# Patient Record
Sex: FEMALE | Race: BLACK OR AFRICAN AMERICAN | NOT HISPANIC OR LATINO | URBAN - METROPOLITAN AREA
[De-identification: names, ages, dates, MRNs, and addresses within clinical notes are randomized per-mention and may not be internally consistent; named-entity substitution may affect disease eponyms.]

---

## 2019-11-11 ENCOUNTER — EMERGENCY (EMERGENCY)
Facility: HOSPITAL | Age: 69
LOS: 1 days | Discharge: HOME | End: 2019-11-11
Attending: EMERGENCY MEDICINE | Admitting: EMERGENCY MEDICINE
Payer: MEDICARE

## 2019-11-11 VITALS
SYSTOLIC BLOOD PRESSURE: 160 MMHG | DIASTOLIC BLOOD PRESSURE: 98 MMHG | RESPIRATION RATE: 15 BRPM | TEMPERATURE: 98 F | OXYGEN SATURATION: 99 % | HEART RATE: 109 BPM

## 2019-11-11 VITALS
HEART RATE: 90 BPM | OXYGEN SATURATION: 100 % | RESPIRATION RATE: 16 BRPM | SYSTOLIC BLOOD PRESSURE: 151 MMHG | DIASTOLIC BLOOD PRESSURE: 85 MMHG

## 2019-11-11 LAB
ANION GAP SERPL CALC-SCNC: 8 MMO/L — SIGNIFICANT CHANGE UP (ref 7–14)
BASOPHILS # BLD AUTO: 0.05 K/UL — SIGNIFICANT CHANGE UP (ref 0–0.2)
BASOPHILS NFR BLD AUTO: 0.9 % — SIGNIFICANT CHANGE UP (ref 0–2)
BUN SERPL-MCNC: 18 MG/DL — SIGNIFICANT CHANGE UP (ref 7–23)
CALCIUM SERPL-MCNC: 9.8 MG/DL — SIGNIFICANT CHANGE UP (ref 8.4–10.5)
CHLORIDE SERPL-SCNC: 103 MMOL/L — SIGNIFICANT CHANGE UP (ref 98–107)
CO2 SERPL-SCNC: 32 MMOL/L — HIGH (ref 22–31)
CREAT SERPL-MCNC: 0.83 MG/DL — SIGNIFICANT CHANGE UP (ref 0.5–1.3)
EOSINOPHIL # BLD AUTO: 0.15 K/UL — SIGNIFICANT CHANGE UP (ref 0–0.5)
EOSINOPHIL NFR BLD AUTO: 2.8 % — SIGNIFICANT CHANGE UP (ref 0–6)
GLUCOSE SERPL-MCNC: 96 MG/DL — SIGNIFICANT CHANGE UP (ref 70–99)
HCT VFR BLD CALC: 44 % — SIGNIFICANT CHANGE UP (ref 34.5–45)
HGB BLD-MCNC: 13.5 G/DL — SIGNIFICANT CHANGE UP (ref 11.5–15.5)
IMM GRANULOCYTES NFR BLD AUTO: 0.2 % — SIGNIFICANT CHANGE UP (ref 0–1.5)
LYMPHOCYTES # BLD AUTO: 1.44 K/UL — SIGNIFICANT CHANGE UP (ref 1–3.3)
LYMPHOCYTES # BLD AUTO: 27.2 % — SIGNIFICANT CHANGE UP (ref 13–44)
MCHC RBC-ENTMCNC: 26.7 PG — LOW (ref 27–34)
MCHC RBC-ENTMCNC: 30.7 % — LOW (ref 32–36)
MCV RBC AUTO: 87 FL — SIGNIFICANT CHANGE UP (ref 80–100)
MONOCYTES # BLD AUTO: 0.42 K/UL — SIGNIFICANT CHANGE UP (ref 0–0.9)
MONOCYTES NFR BLD AUTO: 7.9 % — SIGNIFICANT CHANGE UP (ref 2–14)
NEUTROPHILS # BLD AUTO: 3.23 K/UL — SIGNIFICANT CHANGE UP (ref 1.8–7.4)
NEUTROPHILS NFR BLD AUTO: 61 % — SIGNIFICANT CHANGE UP (ref 43–77)
NRBC # FLD: 0 K/UL — SIGNIFICANT CHANGE UP (ref 0–0)
PLATELET # BLD AUTO: 182 K/UL — SIGNIFICANT CHANGE UP (ref 150–400)
PMV BLD: 12 FL — SIGNIFICANT CHANGE UP (ref 7–13)
POTASSIUM SERPL-MCNC: 3.6 MMOL/L — SIGNIFICANT CHANGE UP (ref 3.5–5.3)
POTASSIUM SERPL-SCNC: 3.6 MMOL/L — SIGNIFICANT CHANGE UP (ref 3.5–5.3)
RBC # BLD: 5.06 M/UL — SIGNIFICANT CHANGE UP (ref 3.8–5.2)
RBC # FLD: 13.1 % — SIGNIFICANT CHANGE UP (ref 10.3–14.5)
SODIUM SERPL-SCNC: 143 MMOL/L — SIGNIFICANT CHANGE UP (ref 135–145)
WBC # BLD: 5.3 K/UL — SIGNIFICANT CHANGE UP (ref 3.8–10.5)
WBC # FLD AUTO: 5.3 K/UL — SIGNIFICANT CHANGE UP (ref 3.8–10.5)

## 2019-11-11 PROCEDURE — 70498 CT ANGIOGRAPHY NECK: CPT | Mod: 26

## 2019-11-11 PROCEDURE — 93010 ELECTROCARDIOGRAM REPORT: CPT

## 2019-11-11 PROCEDURE — 70496 CT ANGIOGRAPHY HEAD: CPT | Mod: 26

## 2019-11-11 PROCEDURE — 99284 EMERGENCY DEPT VISIT MOD MDM: CPT | Mod: 25,GC

## 2019-11-11 RX ORDER — ACETAMINOPHEN 500 MG
975 TABLET ORAL ONCE
Refills: 0 | Status: COMPLETED | OUTPATIENT
Start: 2019-11-11 | End: 2019-11-11

## 2019-11-11 RX ADMIN — Medication 975 MILLIGRAM(S): at 08:59

## 2019-11-11 NOTE — ED PROVIDER NOTE - PATIENT PORTAL LINK FT
You can access the FollowMyHealth Patient Portal offered by [facility] by registering at the following website: [email or web address]. By joining [employer]’s FollowMyHealth portal, you will also be able to view your health information using other applications (apps) compatible with our system.

## 2019-11-11 NOTE — PROGRESS NOTE ADULT - SUBJECTIVE AND OBJECTIVE BOX
Neurology  Consult Note  11-11-19    Name:  GLEN AMADO; 69y (1950)    Reason for Admission: left-sided weakness    Chief Complaint:  HPI: The patient is a 69-year-old right-handed  woman with a PMH of HTN, HLD, migraine, and sciatica who presents to the [facility] ED with 2 days of gradually improving left arm and leg weakness. The patient's last known well was Friday evening before going to bed. Saturday morning, the patient woke up with feelings of weakness in her left arm and leg. She got out of bed and walked to the bathroom, during which the weakness felt more pronounced. She attributes the weakness to sleeping on the affected side. She denies headache, seizure-like convulsions, feelings of confusion, difficulty speaking, but endorses feelings of numbness on both side of her face and her left-sided extremities. Of note, the patient has not taken her prescribed statin for the last 2 months due to cramps, which usually occur in her legs but recently began to occur in her chest. She describes the pain as "cramp-like" and "like a nervous feeling."     Review of Systems:  As states in HPI.        PMHx:   Hypertension  Hyperlipidemia  Sciatica      PFx:   Siblings - CHF, COPD, non-Hodgkin's lymphoma  Mother - CHF  Father - PE     PSuHx:   Hysterectomy in 2013     PSoHx:   Lives in [place]. 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 Denies smoking, drinking, or other illicit/recreational drug use.      Marital Status:  (   )    (  X ) Single    (   )    (  )   Occupation:   Lives with: ( X ) alone  (  ) children   (  ) spouse   (  ) parents  (  ) other  Recent Travel:     Substance Use (street drugs): (X  ) never used  (  ) other:  Tobacco Usage:  (  X ) never smoked   (   ) former smoker   (   ) current smoker  (     ) pack year  Alcohol Usage:  Sexual History:   Sexually active with boyfriend.        Medications:  MEDICATIONS  (STANDING):    MEDICATIONS  (PRN):    Vitals:  T(C): 36.8 (11-11-19 @ 07:40), Max: 36.8 (11-11-19 @ 07:40)  HR: 90 (11-11-19 @ 09:02) (90 - 109)  BP: 151/85 (11-11-19 @ 09:02) (151/85 - 160/98)  RR: 16 (11-11-19 @ 09:02) (15 - 16)  SpO2: 100% (11-11-19 @ 09:02) (99% - 100%)    Labs:                        13.5   5.30  )-----------( 182      ( 11 Nov 2019 09:00 )             44.0     11-11    143  |  103  |  18  ----------------------------<  96  3.6   |  32<H>  |  0.83    Ca    9.8      11 Nov 2019 09:00      CAPILLARY BLOOD GLUCOSE              CSF:                      PHYSICAL EXAMINATION:  General: Well-developed, well nourished, in no acute distress.  Eyes: Conjunctiva and sclera clear.  Neurologic:  - Mental Status:  Alert, awake, oriented to person, place, and time; Speech is fluent with intact naming, repetition, and comprehension; Good overall fund of knowledge; Immediate recall is 3/3 words and delayed recall is 3/3 words at 5 minutes; Able to spell WORLD backwards and perform serial 7 subtraction; Able to read and write a sentence.  - Cranial Nerves II-XII:    II:  Visual fields are full to confrontation; Pupils are equal, round, and reactive to light; Visual acuity is 20/20 bilaterally; Fundoscopic exam is normal with sharp discs.  III, IV, VI:  Extraocular movements are intact without nystagmus.  V:  Facial sensation is intact in the V1-V3 distribution bilaterally.  VII:  Face is symmetric with normal eye closure and smile  VIII:  Hearing is intact to finger rub.  IX, X:  Uvula is midline and soft palate rises symmetrically  XI:  Head turning and shoulder shrug are intact.  XII:  Tongue protudes in the midline.  - Motor:  Strength is 5/5 throughout.  There is no pronator drift.  Normal muscle bulk and tone throughout.  - Reflexes:  2+ and symmetric at the biceps, triceps, brachioradialis, knees, and ankles.  Plantar responses flexor.  - Sensory:  Intact throughout to vibration, and joint-position, light touch, pin prick.  - Coordination:  Finger-nose-finger and heel-knee-shin intact without dysmetria.  Rapid alternating hand movements intact.  - Gait:   Normal steps, base, arm swing, and turning.  Heel and toe walking are normal.  Tandem gait is normal.  Romberg testing is negative.    Radiology:      Impression:    Patient with HTN and HLD presents with 2 days of left-sided weakness and a normal neurologic exam in the setting of recent statin non-adherence. BP elevated on admission. Wait on imaging to determine if CVA occurred, with lacunar infarct a possible cause. ABDC2 score a 6, indicating high risk of stroke following possible TIA.     Plan:  CT non-con head  Anti-platelet therapy (aspirin)      patient on medication adherence  Legend:  [] Uncomplete task.  [P] Ordered and pending task.  [R] Imaging done, pending read.    [x] Completed task. Neurology  Consult Note  11-11-19    Name:  GLEN AMADO; 69y (1950)    Reason for Admission: left-sided weakness    Chief Complaint:  HPI: The patient is a 69-year-old right-handed  woman with a PMH of HTN, HLD, migraine, and sciatica who presents to the [facility] ED with 2 days of gradually improving left arm and leg weakness. The patient's last known well was Friday evening before going to bed. Saturday morning, the patient woke up with feelings of weakness in her left arm and leg. She got out of bed and walked to the bathroom, during which the weakness felt more pronounced. She attributes the weakness to sleeping on the affected side. She denies headache, seizure-like convulsions, feelings of confusion, difficulty speaking, but endorses feelings of numbness on both side of her face and her left-sided extremities. Of note, the patient has not taken her prescribed statin for the last 2 months due to cramps, which usually occur in her legs but recently began to occur in her chest. She describes the pain as "cramp-like" and "like a nervous feeling."     Review of Systems:  As states in HPI.    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      Marital Status:  (   )    (  X ) Single    (   )    (  )   Occupation:   Lives with: ( X ) alone  (  ) children   (  ) spouse   (  ) parents  (  ) other  Recent Travel:     Substance Use (street drugs): (X  ) never used  (  ) other:  Tobacco Usage:  (  X ) never smoked   (   ) former smoker   (   ) current smoker  (     ) pack year  Alcohol Usage:  Sexual History:   Sexually active with boyfriend.        Medications:  MEDICATIONS  (STANDING):    MEDICATIONS  (PRN):    Vitals:  T(C): 36.8 (11-11-19 @ 07:40), Max: 36.8 (11-11-19 @ 07:40)  HR: 90 (11-11-19 @ 09:02) (90 - 109)  BP: 151/85 (11-11-19 @ 09:02) (151/85 - 160/98)  RR: 16 (11-11-19 @ 09:02) (15 - 16)  SpO2: 100% (11-11-19 @ 09:02) (99% - 100%)    PHYSICAL EXAMINATION:  General: Well-developed, well nourished, in no acute distress.  Eyes: Conjunctiva and sclera clear.  Neurologic:  - Mental Status:  Alert, awake, oriented to person, place, and time; Speech is fluent with intact naming, repetition, and comprehension; Good overall fund of knowledge; Immediate recall is 3/3 words and delayed recall is 3/3 words at 5 minutes; Able to spell WORLD backwards and perform serial 7 subtraction; Able to read and write a sentence.  - Cranial Nerves II-XII:    II:  Visual fields are full to confrontation; Pupils are equal, round, and reactive to light; Visual acuity is 20/20 bilaterally; Fundoscopic exam is normal with sharp discs.  III, IV, VI:  Extraocular movements are intact without nystagmus.  V:  Facial sensation is intact in the V1-V3 distribution bilaterally.  VII:  Face is symmetric with normal eye closure and smile  VIII:  Hearing is intact to finger rub.  IX, X:  Uvula is midline and soft palate rises symmetrically  XI:  Head turning and shoulder shrug are intact.  XII:  Tongue protudes in the midline.  - Motor:  Strength is 5/5 throughout.  There is no pronator drift.  Normal muscle bulk and tone throughout.  - Reflexes:  2+ and symmetric at the biceps, triceps, brachioradialis, knees, and ankles.  Plantar responses flexor.  - Sensory:  Intact throughout to vibration, and joint-position, light touch, pin prick.  - Coordination:  Finger-nose-finger and heel-knee-shin intact without dysmetria.  Rapid alternating hand movements intact.  - Gait:   Normal steps, base, arm swing, and turning.  Heel and toe walking are normal.  Tandem gait is normal.  Romberg testing is negative.    Labs:                        13.5   5.30  )-----------( 182      ( 11 Nov 2019 09:00 )             44.0     11-11    143  |  103  |  18  ----------------------------<  96  3.6   |  32<H>  |  0.83    Ca    9.8      11 Nov 2019 09:00    Radiology:

## 2019-11-11 NOTE — CONSULT NOTE ADULT - ASSESSMENT
The patient is a 69-year-old right-handed  woman with a PMH of HTN, HLD, migraine, and sciatica who presents to the [facility] ED with 2 days of gradually improving left arm and leg weakness. The patient's last known well was Friday evening before going to bed. Saturday morning, the patient woke up with feelings of weakness in her left arm and leg. On exam pt is in tact, no focal deficits or weakness.     Impression - Left hemiparesis 2/2 right hemispheric disfunction likely due to small vessel disease .    Plan:  ASA 81mg daily and Plavix for 3 weeks and then asa alone per [facility] protocol for TIA.  Re-start statin with low side effect profile. self DC'ed Lipitor  Please add on hba1c and lipid panel  Pt will follow up with Dr. Patel as an outpatient for full neurological workup as warranted.  Encourage PCP follow up for HTN

## 2019-11-11 NOTE — ED PROVIDER NOTE - CLINICAL SUMMARY MEDICAL DECISION MAKING FREE TEXT BOX
Fer ARECHIGA MD PGY2: Patient here for L arm and leg weakness not demonstrated on neuro exam. Will obtain CTA, CTH non con. If normal, patient can follow-up with neuro as an outpatient.

## 2019-11-11 NOTE — CONSULT NOTE ADULT - SUBJECTIVE AND OBJECTIVE BOX
Neurology  Consult Note  11-11-19    Name:  GLEN AMADO; 69y (1950)    Reason for Admission: left-sided weakness    Chief Complaint:  HPI: The patient is a 69-year-old right-handed  woman with a PMH of HTN, HLD, migraine, and sciatica who presents to the [facility] ED with 2 days of gradually improving left arm and leg weakness. The patient's last known well was Friday evening before going to bed. Saturday morning, the patient woke up with feelings of weakness in her left arm and leg. She got out of bed and walked to the bathroom, during which the weakness felt more pronounced. She attributes the weakness to sleeping on the affected side. She denies headache, seizure-like convulsions, feelings of confusion, difficulty speaking, but endorses feelings of numbness on both side of her face and her left-sided extremities. Of note, the patient has not taken her prescribed statin for the last 2 months due to cramps, which usually occur in her legs but recently began to occur in her chest. She describes the pain as "cramp-like" and "like a nervous feeling."     Review of Systems:  As states in HPI.    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      Marital Status:  (   )    (  X ) Single    (   )    (  )   Occupation:   Lives with: ( X ) alone  (  ) children   (  ) spouse   (  ) parents  (  ) other  Recent Travel:     Substance Use (street drugs): (X  ) never used  (  ) other:  Tobacco Usage:  (  X ) never smoked   (   ) former smoker   (   ) current smoker  (     ) pack year  Alcohol Usage:  Sexual History:   Sexually active with boyfriend.        Medications:  MEDICATIONS  (STANDING):    MEDICATIONS  (PRN):    Vitals:  T(C): 36.8 (11-11-19 @ 07:40), Max: 36.8 (11-11-19 @ 07:40)  HR: 90 (11-11-19 @ 09:02) (90 - 109)  BP: 151/85 (11-11-19 @ 09:02) (151/85 - 160/98)  RR: 16 (11-11-19 @ 09:02) (15 - 16)  SpO2: 100% (11-11-19 @ 09:02) (99% - 100%)    PHYSICAL EXAMINATION:  General: Well-developed, well nourished, in no acute distress.  Eyes: Conjunctiva and sclera clear.  Neurologic:  - Mental Status:  Alert, awake, oriented to person, place, and time; Speech is fluent with intact naming, repetition, and comprehension; Good overall fund of knowledge; Immediate recall is 3/3 words and delayed recall is 3/3 words at 5 minutes; Able to spell WORLD backwards and perform serial 7 subtraction; Able to read and write a sentence.  - Cranial Nerves II-XII:    II:  Visual fields are full to confrontation; Pupils are equal, round, and reactive to light; Visual acuity is 20/20 bilaterally; Fundoscopic exam is normal with sharp discs.  III, IV, VI:  Extraocular movements are intact without nystagmus.  V:  Facial sensation is intact in the V1-V3 distribution bilaterally.  VII:  Face is symmetric with normal eye closure and smile  VIII:  Hearing is intact to finger rub.  IX, X:  Uvula is midline and soft palate rises symmetrically  XI:  Head turning and shoulder shrug are intact.  XII:  Tongue protudes in the midline.  - Motor:  Strength is 5/5 throughout.  There is no pronator drift.  Normal muscle bulk and tone throughout.  - Reflexes:  2+ and symmetric at the biceps, triceps, brachioradialis, knees, and ankles.  Plantar responses flexor.  - Sensory:  Intact throughout to vibration, and joint-position, light touch, pin prick.  - Coordination:  Finger-nose-finger and heel-knee-shin intact without dysmetria.  Rapid alternating hand movements intact.  - Gait:   Normal steps, base, arm swing, and turning.  Heel and toe walking are normal.  Tandem gait is normal.  Romberg testing is negative.    Labs:                        13.5   5.30  )-----------( 182      ( 11 Nov 2019 09:00 )             44.0     11-11    143  |  103  |  18  ----------------------------<  96  3.6   |  32<H>  |  0.83    Ca    9.8      11 Nov 2019 09:00    Radiology:    < from: CT Head No Cont (11.11.19 @ 10:46) >  Impression: No evidence of acute hemorrhage mass or mass effect.    The patient was injected with approximately 90 cc of Omnipaque 350 IV.   Multiple axial sections were performed through the neck and [place]   [place] for purposes of a CT angiogram. Coronal and sagittal   reconstructions were performed. 3-D reconstructions were performed.    Minimal calcification is seen involving the carotid artery bifurcation   bilaterally.    Both distal common carotid, proximal internal and external carotids   appear normal as well as both vertebralarteries. No significant stenosis   is seen.    The dural venous sinuses demonstrate normal enhancement with no evidence   of venous sinus thrombosis.    Evaluation of the soft tissue neck structures appear unremarkable    The visualized portion of both lung apices appear clear.    Impression: Unremarkable CTA of the neck and [place].    < end of copied text >

## 2019-11-11 NOTE — ED PROVIDER NOTE - NSFOLLOWUPCLINICS_GEN_ALL_ED_FT
[facility] - Primary Care  Primary Care  [address] [place] [address]  Phone: [phone]  Fax:   Follow Up Time: 4-6 Days

## 2019-11-11 NOTE — ED ADULT TRIAGE NOTE - CHIEF COMPLAINT QUOTE
alert no distress c/o left sided weakness started on saturday   weakness hasn't changed  no speech disturbance dizziness or blurry vision  hx HTM  high cholesterol    also c/o back pain

## 2019-11-11 NOTE — ED PROVIDER NOTE - CARE PROVIDER_API CALL
Giovanni Patel (DO)  Neurology; Vascular Neurology  [address] [facility]  [place] [address]  Phone: [phone]  Fax: [phone]  Follow Up Time: 1-3 Days

## 2019-11-11 NOTE — PROGRESS NOTE ADULT - ASSESSMENT
The patient is a 69-year-old right-handed  woman with a PMH of HTN, HLD, migraine, and sciatica who presents to the [facility] ED with 2 days of gradually improving left arm and leg weakness. The patient's last known well was Friday evening before going to bed. Saturday morning, the patient woke up with feelings of weakness in her left arm and leg. On exam pt is in tact, no focal deficits or weakness.     Impression -     Plan:  ASA 81mg daily and Plavix for 3 weeks and then asa alone per [facility] protocol for TIA.  Re-start statin with low side effect profile. self DC'ed Lipitor  Please add on hba1c and lipid panel  Pt will follow up with Dr. Patel as an outpatient for full neurological workup

## 2019-11-11 NOTE — ED ADULT NURSE NOTE - OBJECTIVE STATEMENT
Pt presents to  15 A&Ox4 complaining of L sided weakness starting this past saturday 11/9. Pt states she woke up and felt "funny" on saturday morrning. Pt able to move all extremities, denies headache, n/v/d, no facial drooping. Pt complaining of L leg pain 4/10 when moving, 1/10 while laying in bed. Pt presents to  15 A&Ox4 complaining of L sided weakness starting this past saturday 11/9. Pt states she woke up and felt "funny" on saturday morrning. Pt able to move all extremities evenly, denies headache, n/v/d, no facial drooping. Pt complaining of L leg pain 4/10 when moving, 1/10 while laying in bed. Pt put on cardiac monitor, 20g started on R AC, labs drawn and sent. Pt in no obvious distress, will continue to monitor.

## 2019-11-11 NOTE — ED PROVIDER NOTE - NSFOLLOWUPINSTRUCTIONS_ED_ALL_ED_FT
Please follow-up with Dr Patel tomorrow in clinic. Please return to the ED for any concerns. Please follow-up with Dr Patel tomorrow in clinic. At this appointment you should ask about being placed on dual antiplatelet therapy. You should also follow-up with your primary care doctor in the next week to adjust your high blood pressure medication. Please return to the ED for any concerns.

## 2019-11-11 NOTE — ED PROVIDER NOTE - PHYSICAL EXAMINATION
Fer ARECHIGA MD PGY2:   PHYSICAL EXAM:    GENERAL: NAD, well-developed  HEENT:  Atraumatic, Normocephalic  CHEST/LUNG: Chest rise equal bilaterally. CTAB.   HEART: Regular rate and rhythm  ABDOMEN: Soft, Nontender, Nondistended.   EXTREMITIES:  2+ Peripheral Pulses.  PSYCH: A&Ox3  SKIN: No obvious rashes or lesions  NEUROLOGY: strength 5/5 assessed by hang , at elbow and shoulder in L arm. No gross changes in sensation at fingertips or along arm on L arm but "Altered" sensation over L tricep. CN 2 - 12 intact. Able to walk comfortably without issue.

## 2019-11-11 NOTE — ED PROVIDER NOTE - PROGRESS NOTE DETAILS
neurology consulted Fer ARECHIGA MD PGY2: Patient seen by neurology. Imaging nl. No acute process suspected - possible TIA. Can be seen by Dr Patel tomorrow in clinic. Pending discussion with neuro attending, patient to be d/c'd if agreeable.

## 2019-11-11 NOTE — ED PROVIDER NOTE - OBJECTIVE STATEMENT
Fer ARECHIGA MD PGY2: 69 F PMH HTN here with acute L arm and L leg weakness since Saturday. Got out of bed and noticed the weakness. Thought it was from sleeping oddly and didn't think anything of it. Persistent and not worsening, here now due to the lack of improvement. No hx of CVA or MI. Has hx of back pain and sicatica, but normall on the right ride. Patient able to ambulate without difficulty.

## 2019-11-11 NOTE — ED PROVIDER NOTE - ATTENDING CONTRIBUTION TO CARE
69 F PMH HTN here with acute L arm and L leg weakness since Saturday. Got out of bed and noticed the weakness. Thought it was from sleeping oddly and didn't think anything of it. Persistent and not worsening, here now due to the lack of improvement. No hx of CVA or MI. Has hx of back pain and sciatica, but normal on the right ride. Patient able to ambulate without difficulty. On exam: VSS lungs, heart, pulses, abdomen,  extremities, skin, all normal on exam. Neuro Normal speech and mental status, cranial nerves normal, gait normal, no pronator drift, FTN normal, motor and sensory normal. IMP: ? weakness of L arm and leg on and off since Sat (3 days) in 69F with HTN. Currently normal neuro exam. Plan: Labs EKG CT head neuro consult.

## 2021-07-27 ENCOUNTER — EMERGENCY (EMERGENCY)
Facility: HOSPITAL | Age: 71
LOS: 1 days | Discharge: HOME | End: 2021-07-27
Attending: EMERGENCY MEDICINE | Admitting: CLINIC/CENTER
Payer: MEDICARE

## 2021-07-27 VITALS
SYSTOLIC BLOOD PRESSURE: 154 MMHG | DIASTOLIC BLOOD PRESSURE: 104 MMHG | RESPIRATION RATE: 18 BRPM | OXYGEN SATURATION: 97 % | HEART RATE: 96 BPM | TEMPERATURE: 99 F

## 2021-07-27 LAB
ALBUMIN SERPL ELPH-MCNC: 4.2 G/DL — SIGNIFICANT CHANGE UP (ref 3.3–5)
ALP SERPL-CCNC: 85 U/L — SIGNIFICANT CHANGE UP (ref 40–120)
ALT FLD-CCNC: 14 U/L — SIGNIFICANT CHANGE UP (ref 4–33)
ANION GAP SERPL CALC-SCNC: 12 MMOL/L — SIGNIFICANT CHANGE UP (ref 7–14)
AST SERPL-CCNC: 16 U/L — SIGNIFICANT CHANGE UP (ref 4–32)
BASOPHILS # BLD AUTO: 0.06 K/UL — SIGNIFICANT CHANGE UP (ref 0–0.2)
BASOPHILS NFR BLD AUTO: 0.8 % — SIGNIFICANT CHANGE UP (ref 0–2)
BILIRUB SERPL-MCNC: <0.2 MG/DL — SIGNIFICANT CHANGE UP (ref 0.2–1.2)
BUN SERPL-MCNC: 18 MG/DL — SIGNIFICANT CHANGE UP (ref 7–23)
CALCIUM SERPL-MCNC: 9.6 MG/DL — SIGNIFICANT CHANGE UP (ref 8.4–10.5)
CHLORIDE SERPL-SCNC: 103 MMOL/L — SIGNIFICANT CHANGE UP (ref 98–107)
CO2 SERPL-SCNC: 27 MMOL/L — SIGNIFICANT CHANGE UP (ref 22–31)
CREAT SERPL-MCNC: 0.85 MG/DL — SIGNIFICANT CHANGE UP (ref 0.5–1.3)
EOSINOPHIL # BLD AUTO: 0.24 K/UL — SIGNIFICANT CHANGE UP (ref 0–0.5)
EOSINOPHIL NFR BLD AUTO: 3.4 % — SIGNIFICANT CHANGE UP (ref 0–6)
GLUCOSE SERPL-MCNC: 103 MG/DL — HIGH (ref 70–99)
HCT VFR BLD CALC: 40.3 % — SIGNIFICANT CHANGE UP (ref 34.5–45)
HGB BLD-MCNC: 12.6 G/DL — SIGNIFICANT CHANGE UP (ref 11.5–15.5)
IANC: 4.04 K/UL — SIGNIFICANT CHANGE UP (ref 1.5–8.5)
IMM GRANULOCYTES NFR BLD AUTO: 0.3 % — SIGNIFICANT CHANGE UP (ref 0–1.5)
LYMPHOCYTES # BLD AUTO: 2.17 K/UL — SIGNIFICANT CHANGE UP (ref 1–3.3)
LYMPHOCYTES # BLD AUTO: 30.3 % — SIGNIFICANT CHANGE UP (ref 13–44)
MCHC RBC-ENTMCNC: 26.9 PG — LOW (ref 27–34)
MCHC RBC-ENTMCNC: 31.3 GM/DL — LOW (ref 32–36)
MCV RBC AUTO: 86.1 FL — SIGNIFICANT CHANGE UP (ref 80–100)
MONOCYTES # BLD AUTO: 0.62 K/UL — SIGNIFICANT CHANGE UP (ref 0–0.9)
MONOCYTES NFR BLD AUTO: 8.7 % — SIGNIFICANT CHANGE UP (ref 2–14)
NEUTROPHILS # BLD AUTO: 4.04 K/UL — SIGNIFICANT CHANGE UP (ref 1.8–7.4)
NEUTROPHILS NFR BLD AUTO: 56.5 % — SIGNIFICANT CHANGE UP (ref 43–77)
NRBC # BLD: 0 /100 WBCS — SIGNIFICANT CHANGE UP
NRBC # FLD: 0 K/UL — SIGNIFICANT CHANGE UP
NT-PROBNP SERPL-SCNC: 29 PG/ML — SIGNIFICANT CHANGE UP
PLATELET # BLD AUTO: 181 K/UL — SIGNIFICANT CHANGE UP (ref 150–400)
POTASSIUM SERPL-MCNC: 4.1 MMOL/L — SIGNIFICANT CHANGE UP (ref 3.5–5.3)
POTASSIUM SERPL-SCNC: 4.1 MMOL/L — SIGNIFICANT CHANGE UP (ref 3.5–5.3)
PROT SERPL-MCNC: 7.5 G/DL — SIGNIFICANT CHANGE UP (ref 6–8.3)
RBC # BLD: 4.68 M/UL — SIGNIFICANT CHANGE UP (ref 3.8–5.2)
RBC # FLD: 13.6 % — SIGNIFICANT CHANGE UP (ref 10.3–14.5)
SODIUM SERPL-SCNC: 142 MMOL/L — SIGNIFICANT CHANGE UP (ref 135–145)
T4 FREE SERPL-MCNC: 1.2 NG/DL — SIGNIFICANT CHANGE UP (ref 0.9–1.8)
TROPONIN T, HIGH SENSITIVITY RESULT: 6 NG/L — SIGNIFICANT CHANGE UP
TSH SERPL-MCNC: 2.07 UIU/ML — SIGNIFICANT CHANGE UP (ref 0.27–4.2)
WBC # BLD: 7.15 K/UL — SIGNIFICANT CHANGE UP (ref 3.8–10.5)
WBC # FLD AUTO: 7.15 K/UL — SIGNIFICANT CHANGE UP (ref 3.8–10.5)

## 2021-07-27 PROCEDURE — 93010 ELECTROCARDIOGRAM REPORT: CPT

## 2021-07-27 PROCEDURE — 99284 EMERGENCY DEPT VISIT MOD MDM: CPT | Mod: 25

## 2021-07-27 PROCEDURE — 71045 X-RAY EXAM CHEST 1 VIEW: CPT | Mod: 26

## 2021-07-27 NOTE — ED ADULT NURSE NOTE - NSIMPLEMENTINTERV_GEN_ALL_ED
Implemented All Universal Safety Interventions:  [place] to call system. Call bell, personal items and telephone within reach. Instruct patient to call for assistance. Room bathroom lighting operational. Non-slip footwear when patient is off stretcher. Physically safe environment: no spills, clutter or unnecessary equipment. Stretcher in lowest position, wheels locked, appropriate side rails in place.

## 2021-07-27 NOTE — ED ADULT NURSE NOTE - NS ED NURSE RECORD ANOTHER HT AND WT
pt states aprox 3 hours prior to arrival he began feeling numbness in the top right side of his head and tongue numbness. Yes

## 2021-07-27 NOTE — ED ADULT NURSE NOTE - OBJECTIVE STATEMENT
Float RN- Patient received with c/o palpitations and lightheadedness x 2pm today. Denies chest pain, SOB or syncope. Pt in NAD. Placed on the cardiac monitor. NSR noted. 20g angiocath placed on R AC.  Pending MD stewart. Endorsed to primary RN Carlos

## 2021-07-28 VITALS
DIASTOLIC BLOOD PRESSURE: 85 MMHG | TEMPERATURE: 98 F | RESPIRATION RATE: 20 BRPM | SYSTOLIC BLOOD PRESSURE: 132 MMHG | HEART RATE: 82 BPM | OXYGEN SATURATION: 99 %

## 2021-07-28 PROBLEM — I10 ESSENTIAL (PRIMARY) HYPERTENSION: Chronic | Status: ACTIVE | Noted: 2019-11-11

## 2021-07-28 LAB — SARS-COV-2 RNA SPEC QL NAA+PROBE: SIGNIFICANT CHANGE UP

## 2021-07-28 NOTE — ED PROVIDER NOTE - OBJECTIVE STATEMENT
72yo F hx of HTN comes to ER for palpitations. x 3 days, intermittent, no attributed triggers, lasts a few minutes and resolves, no cp, sob, diaphoresis, or syncope. Has had this before, OP work up including a holter monitor which she has not received the results for yet. OP cards Dr. Scott who wants her to have a bubble study. 72yo F hx of HTN comes to ER for palpitations. x 3 days, intermittent, no attributed triggers, lasts a few minutes and resolves, no cp, sob, diaphoresis, or syncope. Has had this before, OP work up including a holter monitor which she has not received the results for yet. OP cards Dr. Scott who wants her to have a bubble study. Denies f/c, n/v, recent illness, cough, abdominal pain, change in urine or bowel.

## 2021-07-28 NOTE — ED PROVIDER NOTE - PATIENT PORTAL LINK FT
You can access the FollowMyHealth Patient Portal offered by [facility] by registering at the following website: [email or web address]. By joining [employer]’s FollowMyHealth portal, you will also be able to view your health information using other applications (apps) compatible with our system.

## 2021-07-28 NOTE — ED PROVIDER NOTE - NSFOLLOWUPCLINICS_GEN_ALL_ED_FT
[facility]  Cardiology  [address] [place] [id number]  Phone: [phone]  Fax:

## 2021-07-28 NOTE — ED PROVIDER NOTE - PROGRESS NOTE DETAILS
NORMA Trivedi (Resident) - work up negative including negative trops, and thyroid screen. Will dc w/ cardiology f/u as pt is still asymptomatic.

## 2021-07-28 NOTE — ED PROVIDER NOTE - CLINICAL SUMMARY MEDICAL DECISION MAKING FREE TEXT BOX
Pt w/ asymptomatic palpitations. VSS. Exam unremarkable. Differential for palpitations include acs, chf, infection, electrolyte abnormalities, and thyroid abdnormalities. Will obtain labs, cardiac markers, ekg, cxr, tsh/freet4. Reassess. Pt w/ asymptomatic palpitations. VSS. Exam unremarkable. Differential for palpitations include acs, chf, infection, electrolyte abnormalities, and thyroid abnormalities. Will obtain labs, cardiac markers, ekg, cxr, tsh/freet4. Reassess.

## 2021-07-28 NOTE — ED PROVIDER NOTE - NSFOLLOWUPINSTRUCTIONS_ED_ALL_ED_FT
You were seen in the Emergency Department for palpitations. Your blood work, ekg and xrays were all normal. Please follow up with cardiology for continued work up.      1) Advance activity as tolerated.   2) Continue all previously prescribed medications as directed.    3) Follow up with your primary care physician in 24-48 hours - take copies of your results.    4) Return to the Emergency Department for worsening or persistent symptoms, and/or ANY NEW OR CONCERNING SYMPTOMS.

## 2021-07-28 NOTE — ED PROVIDER NOTE - PHYSICAL EXAMINATION
General: non-toxic, NAD  HEENT: NCAT, PERRL  Cardiac: RRR, no murmurs, 2+ peripheral pulses  Chest: CTA  Abdomen: soft, non-distended, bowel sounds present, no ttp, no rebound or guarding  Extremities: no peripheral edema, calf tenderness, or leg size discrepancies  Skin: no rashes  Neuro: AAOx4, 5+motor, sensory grossly intact  Psych: mood and affect appropriate

## 2021-07-28 NOTE — ED PROVIDER NOTE - NSPTACCESSSVCSAPPTDETAILS_ED_ALL_ED_FT
please help pt set up appt w/ cardiology for palpitations. Wants to switch from her current cardiologist who has not been communicating with her.

## 2021-07-28 NOTE — ED PROVIDER NOTE - ATTENDING CONTRIBUTION TO CARE
72yo F with PMHX HTN and intermitent palpitations for several months, worse x 2 days so came to ED for evaluation. Says palpitations feel like heart racing sensation, sometimes with associated lightheadedness but no syncope, chest pains, fevers, headaches, weight loss, sob, or other complaints. Currently asymptomatic. Has seen cardiology in past for this with echo and Holter monitor but never got results and wants new cardiologist    General: Patient in no apparent distress, AAO x 3  Skin: Dry and intact  HEENT: Head atraumatic. Oral mucosa moist. No pharyngeal exudates or tonsillar enlargement  Eyes: Conjunctiva normal  Cardiac: Regular rhythm and rate. No pretibial edema b/l  Respiratory: Lungs clear b/l and symmetric. No respiratory distress. Able to speak in complete sentences.  Gastrointestinal: Abdomen soft, nondistended, nontender  Musculoskeletal: Moves all extremities spontaneously  Neurological: alert and oriented to person, place, and time  Psychiatric: Cooperative    EKG nsr no acute ischemic changes. QT <500    a/p  no symptoms of PE, EKG with no signs brugada, stemi, dysrythmias  will get labs with tsh, cxr  if studies with no acute abnormalities, plan to dc home with cardio f/u

## 2021-10-12 ENCOUNTER — NON-APPOINTMENT (OUTPATIENT)
Age: 71
End: 2021-10-12

## 2021-10-12 ENCOUNTER — APPOINTMENT (OUTPATIENT)
Dept: CARDIOLOGY | Facility: CLINIC | Age: 71
End: 2021-10-12
Payer: MEDICARE

## 2021-10-12 VITALS
SYSTOLIC BLOOD PRESSURE: 126 MMHG | HEIGHT: 66 IN | HEART RATE: 88 BPM | DIASTOLIC BLOOD PRESSURE: 84 MMHG | BODY MASS INDEX: 27.97 KG/M2 | WEIGHT: 174 LBS | OXYGEN SATURATION: 100 %

## 2021-10-12 DIAGNOSIS — R93.1 ABNORMAL FINDINGS ON DIAGNOSTIC IMAGING OF HEART AND CORONARY CIRCULATION: ICD-10-CM

## 2021-10-12 DIAGNOSIS — E78.5 HYPERLIPIDEMIA, UNSPECIFIED: ICD-10-CM

## 2021-10-12 DIAGNOSIS — Z78.9 OTHER SPECIFIED HEALTH STATUS: ICD-10-CM

## 2021-10-12 DIAGNOSIS — Z82.49 FAMILY HISTORY OF ISCHEMIC HEART DISEASE AND OTHER DISEASES OF THE CIRCULATORY SYSTEM: ICD-10-CM

## 2021-10-12 DIAGNOSIS — Z80.42 FAMILY HISTORY OF MALIGNANT NEOPLASM OF PROSTATE: ICD-10-CM

## 2021-10-12 PROCEDURE — 93000 ELECTROCARDIOGRAM COMPLETE: CPT

## 2021-10-12 PROCEDURE — 99204 OFFICE O/P NEW MOD 45 MIN: CPT

## 2021-10-12 RX ORDER — ATORVASTATIN CALCIUM 10 MG/1
10 TABLET, FILM COATED ORAL
Qty: 90 | Refills: 3 | Status: ACTIVE | COMMUNITY

## 2021-10-12 RX ORDER — AMLODIPINE BESYLATE 5 MG/1
5 TABLET ORAL DAILY
Refills: 4 | Status: ACTIVE | COMMUNITY

## 2021-10-12 RX ORDER — HYDROCHLOROTHIAZIDE 12.5 MG/1
12.5 TABLET ORAL
Qty: 90 | Refills: 2 | Status: ACTIVE | COMMUNITY

## 2021-10-13 NOTE — REVIEW OF SYSTEMS
[Palpitations] : palpitations [Negative] : Heme/Lymph [SOB] : no shortness of breath [Dyspnea on exertion] : not dyspnea during exertion [Chest Discomfort] : no chest discomfort [Lower Ext Edema] : no extremity edema [Leg Claudication] : no intermittent leg claudication [Orthopnea] : no orthopnea [Syncope] : no syncope

## 2021-10-13 NOTE — DISCUSSION/SUMMARY
[___ Month(s)] : in [unfilled] month(s) [FreeTextEntry1] : The patient is a 71-year-old female hypertension, hyperlipidemia, abnormal echo 2020 with palpitations.\par #1 CV- neg exam, told of RVE and PFO?, echo ordered\par #2 Palpitations- unlikely related to voltaren cream, event monitor placed\par #3 Htn- continue amlodipine and HCTZ\par #4 Lipids- continue atorvastatin

## 2021-10-13 NOTE — HISTORY OF PRESENT ILLNESS
[FreeTextEntry1] : Dilip is a 71-year-old female htn,hyperlipidemia with palpitations over the summer. She feels like her heart rate goes fast and better when she sits down and relaxes. Thought it was the voltaren cream which she stopped. No CP, lightheadedness , dizziness or SOB. Without the cream not daily and not at night like before. Now two to three times a week. It can last up to 5-10 minutes. She uses a stationary bicycle which fatigues her but no palpitations. She had an ECHO 1/2020 and recently told that she has RVE.

## 2021-12-03 ENCOUNTER — APPOINTMENT (OUTPATIENT)
Dept: CARDIOLOGY | Facility: CLINIC | Age: 71
End: 2021-12-03
Payer: MEDICARE

## 2021-12-03 DIAGNOSIS — I49.3 VENTRICULAR PREMATURE DEPOLARIZATION: ICD-10-CM

## 2021-12-03 DIAGNOSIS — I10 ESSENTIAL (PRIMARY) HYPERTENSION: ICD-10-CM

## 2021-12-03 DIAGNOSIS — R00.2 PALPITATIONS: ICD-10-CM

## 2021-12-03 PROCEDURE — 93306 TTE W/DOPPLER COMPLETE: CPT

## 2021-12-12 PROBLEM — I10 ESSENTIAL HYPERTENSION: Status: ACTIVE | Noted: 2021-10-11

## 2021-12-12 PROBLEM — R00.2 PALPITATIONS: Status: ACTIVE | Noted: 2021-10-11

## 2021-12-12 PROBLEM — I49.3 PREMATURE VENTRICULAR CONTRACTIONS (PVCS) (VPCS): Status: ACTIVE | Noted: 2021-10-29

## 2021-12-21 ENCOUNTER — OUTPATIENT (OUTPATIENT)
Dept: OUTPATIENT SERVICES | Facility: HOSPITAL | Age: 71
LOS: 1 days | End: 2021-12-21
Payer: COMMERCIAL

## 2021-12-21 ENCOUNTER — APPOINTMENT (OUTPATIENT)
Dept: CV DIAGNOSTICS | Facility: HOSPITAL | Age: 71
End: 2021-12-21

## 2021-12-21 DIAGNOSIS — I49.3 VENTRICULAR PREMATURE DEPOLARIZATION: ICD-10-CM

## 2021-12-21 PROCEDURE — 93016 CV STRESS TEST SUPVJ ONLY: CPT

## 2021-12-21 PROCEDURE — 93018 CV STRESS TEST I&R ONLY: CPT

## 2021-12-21 PROCEDURE — 93017 CV STRESS TEST TRACING ONLY: CPT

## 2022-05-23 ENCOUNTER — RX RENEWAL (OUTPATIENT)
Age: 72
End: 2022-05-23

## 2022-09-19 ENCOUNTER — RX RENEWAL (OUTPATIENT)
Age: 72
End: 2022-09-19

## 2022-09-20 ENCOUNTER — RX RENEWAL (OUTPATIENT)
Age: 72
End: 2022-09-20

## 2022-09-20 RX ORDER — METOPROLOL SUCCINATE 50 MG/1
50 TABLET, EXTENDED RELEASE ORAL
Qty: 90 | Refills: 0 | Status: ACTIVE | COMMUNITY
Start: 2021-10-29 | End: 1900-01-01

## 2022-10-11 NOTE — ED ADULT TRIAGE NOTE - SOURCE OF INFORMATION
Patient Patient resents requesting suture removal approximate 20 days after evaluation removal tolerated well.  Patient hemodynamically stable otherwise.  No signs of infection or induration suture removed as continuous noninterrupted.